# Patient Record
Sex: FEMALE | Race: WHITE
[De-identification: names, ages, dates, MRNs, and addresses within clinical notes are randomized per-mention and may not be internally consistent; named-entity substitution may affect disease eponyms.]

---

## 2019-07-16 NOTE — 2DMMODE
[place] [address]
Phone:  [phone] 2 D/M-MODE ECHOCARDIOGRAM     
_______________________________________________________________________________
 
Name:         JORDYN SARMIENTO                 Room:          [address]    ADM IN 
[facility]#:    [id number]     Account #:     [id number]  
Admission:    19    Attend Phys:   Dylan Patel
Discharge:                Date of Birth: [birth year]  
Date of Service: 19 1641  Report #:      [id number]
        [id number]
_______________________________________________________________________________
THIS REPORT FOR:  //name//                      
 
 
--------------- APPROVED REPORT --------------
 
 
Study performed:  2019 15:28:50
 
EXAM: Comprehensive 2D, Doppler, and color-flow 
Echocardiogram 
Patient Location: In-Patient   
Room #:  219     Status:  routine
 
        BSA:         1.82
HR: 61 bpm   BP:          106/80 mmHg 
Rhythm: Atrial Fibrillation     
 
Other Information 
Study Quality: Good
 
Indications
Atrial Fibrillation
 
2D Dimensions
IVSd:  9.29 (7-11mm) LVOT Diam:  20.18 (18-24mm) 
LVDd:  44.71 mm  
PWd:  10.68 (7-11mm) 
LVDs:  22.64 (25-40mm) 
Aortic Root:  33.32 mm 
 
Volumes
Left Atrial Volume (Systole) 
    LA ESV Index:  25.50 mL/m2
 
Aortic Valve
AoV Peak Thien.:  1.15 m/s 
AO Peak Gr.:  5.32 mmHg  LVOT Max P.89 mmHg
AO Mean Gr.:  2.40 mmHg  LVOT Mean P.24 mmHg
    LVOT Max V:  0.85 m/s
AO V2 VTI:  18.16 cm  LVOT Mean V:  0.50 m/s
TIFFANIE (VTI):  3.15 cm2  LVOT V1 VTI:  17.90 cm
 
Mitral Valve
    E/A Ratio:  1.70
    MV Decel. Time:  190.62 ms
MV E Max Thien.:  0.66 m/s 
 
 
[place] [address]
Phone:  [phone]                     2 D/M-MODE ECHOCARDIOGRAM     
_______________________________________________________________________________
 
Name:         JORDYN SARMIENTO                 Room:          [address] [place] 
..#:    [id number]     Account #:     [id number]  
Admission:    19    Attend Phys:   Dylan Patel
Discharge:                Date of Birth: [birth year]  
Date of Service: 19 1641  Report #:      [id number]
        [id number]
_______________________________________________________________________________
MV PHT:  55.28 ms  
MVA (PHT):  3.98 cm2  
 
TDI
E/Lateral E':  5.08 E/Medial E':  5.50
   Medial E' Thien.:  0.12 m/s
   Lateral E' Thien.:  0.13 m/s
 
Pulmonary Valve
PV Peak Thien.:  0.80 m/s PV Peak Gr.:  2.56 mmHg
 
Tricuspid Valve
    RAP Estimate:  5.00 mmHg
TR Peak Gr.:  26.65 mmHg RVSP:  31.00 mmHg
    PA Pressure:  31.00 mmHg
 
Left Ventricle
The left ventricle is normal size. There is normal LV segmental wall 
motion. There is normal left ventricular wall thickness. Left 
ventricular systolic function is normal. LVEF is 55-60%. This study 
is not technically sufficient to allow evaluation of the LV diastolic 
function due to atrial fibrillation.
 
Right Ventricle
The right ventricle is normal size. The right ventricular systolic 
function is normal.
 
Atria
The left atrium size is normal. The right atrium size is 
normal.
 
Aortic Valve
The aortic valve is normal in structure. No aortic regurgitation is 
present. There is no aortic valvular stenosis.
 
Mitral Valve
The mitral valve is normal in structure. Trace mitral regurgitation. 
No evidence of mitral valve stenosis.
 
Tricuspid Valve
The tricuspid valve is normal in structure. Mild tricuspid 
regurgitation. The RVSP is 30-35 mmHg.
 
Pulmonic Valve
The pulmonary valve is normal in structure. Trace pulmonic 
regurgitation.
 
 
[place] [address]
Phone:  [phone]                     2 D/M-MODE ECHOCARDIOGRAM     
_______________________________________________________________________________
 
Name:         JORDYN SARMIENTO                 Room:          [address] [place] 
[facility]#:    [id number]     Account #:     [id number]  
Admission:    19    Attend Phys:   Dylan Patel
Discharge:                Date of Birth: [birth year]  
Date of Service: 19 1641  Report #:      [id number]
        [id number]
_______________________________________________________________________________
 
Great Vessels
The aortic root is normal in size. IVC is normal in size and 
collapses >50% with inspiration.
 
Pericardium
There is no pericardial effusion.
 
<Conclusion>
The left ventricle is normal size.
There is normal left ventricular wall thickness.
Left ventricular systolic function is normal.
LVEF is 55-60%.
Trace mitral regurgitation.
Mild tricuspid regurgitation.
The RVSP is 30-35 mmHg.
 
 
 
 
 
 
 
 
 
 
 
 
 
 
 
 
 
 
 
 
 
 
 
 
 
 
 
 
  <ELECTRONICALLY SIGNED>
                                           By: Rick Bashir MD, FACC   
  19
D: 19   _____________________________________
T: 19   Rick Bashir MD, FACC     /INF

## 2019-07-16 NOTE — EKG
[place] [address]
Phone:  [phone]                     ELECTROCARDIOGRAM REPORT      
_______________________________________________________________________________
 
Name:       JORDYN SARMIENTO                  Room:           [address]    ADM IN  
..#:  [id number]      Account #:      [id number]  
Admission:  19     Attend Phys:    NANCY Stevens
Discharge:               Date of Birth:  [birth year]  
         Report #: [id number]
    [id number]
_______________________________________________________________________________
THIS REPORT FOR:  //name//                      
 
                         [facility] ED
                                       
Test Date:    2019               Test Time:    10:04:25
Pat Name:     JORDYN SARMIENTO              Department:   
Patient ID:   [id number]            Room:         [facility]
Gender:       F                        Technician:   [facility]
:          1955               Requested By: Sai Boles
Order Number: [id number] MD:   Rick Bashir
                                 Measurements
Intervals                              Axis          
Rate:         116                      P:            
NC:                                    QRS:          67
QRSD:         116                      T:            67
QT:           368                                    
QTc:          512                                    
                           Interpretive Statements
Atrial fibrillation with rapid ventricular response rate with aberrancy 
Baseline wander in lead(s) I,II,aVR
Compared to ECG 2013 11:53:37
Sinus rhythm no longer present
Ventricular premature complex(es) no longer present
Incomplete right bundle-branch block no longer present
 
Electronically Signed On 2019 16:25:02 CDT by Rick Bashir
[email or web address]
 
 
 
 
 
 
 
 
 
 
 
 
 
 
 
 
  <ELECTRONICALLY SIGNED>
                                           By: Rick Bashir MD, FACC   
  19     1625
D: 19 1004   _____________________________________
T: 19 1004   Rick Bashir MD, FACC     /EPI

## 2019-07-16 NOTE — NUR
PATIENT PRESENTED  PER CART WITH AA EPISODE OF A FIB WITH RVR.  PATIENT
IS AELRT AND ORIENTED X 4.  SHE DENIES CHEST PAIN AND SOA ON ARRIVAL.  BEDSIDE
ECHO PERFORMED.  PATIENT ADMITTED AND ORIENTED TO THE ROOM.  IV CARDIZEM IS
INFUSING AT 15 ML/HR.  PATIENT PLACED ON TELE MONITOR.  TELE SHOWS CONTINUED
AFIB.  WILL CONTINUE TO MONITOR VS AND ASSESSMENTS.

## 2019-07-17 NOTE — NUR
INITIAL ASSESSMENT:
Pt evaluated for d/c planning needs.  Reviewed chart and spoke with nurse and
spouse.  Pt is currently off unit for testing.  Pt lives in house with spouse
and was independent with ADL's prior to admission to the hospital.  Pt remains
active in the community and is still driving.  Pt and her  are retired.
Pt uses no DME and has not had home health.  Pt plans on returning home on d/c
from hospital.  Will remain available to assist as needed.

## 2019-07-17 NOTE — NUR
IV REMOVED, INFORMATION GIVEN TO PT ABOUT FOLLOW UP AND MEDICATION.
PRESCRIPTIONS GIVEN, PAPERWORK SIGNED. PT [facility] 1936 WITH .

## 2019-07-17 NOTE — NUR
PT SOMEWHAT PROGRESSING TOWARDS GOALS THIS SHIFT. COMPLETED STRESS TEST. TELE
REMAINS SR WITH MULTIPLE PAC'S AND PVC'S. MEDICATIONS CHANGED TO ORAL
AMIODARONE THIS SHIFT. NO OTHER CONCERNS AT THIS TIME. CLWR. WCTM.

## 2019-07-17 NOTE — CARDNUC
[place] [address]
Phone:  [phone]                     CARDIAC NUCLEAR IMAGING REPORT
_______________________________________________________________________________
 
Name:         JORDYN SARMIENTO                 Room:          [address] [place] 
[facility]#:    [id number]     Account #:     [id number]  
Admission:    07/16/19    Attend Phys:   Dylan Patel
Discharge:                Date of Birth: [birth year]  
Date of Service: 07/17/19 1625  Report #:      [id number]
        [id number] 
_______________________________________________________________________________
THIS REPORT FOR:  //name//                      
 
 
--------------- APPROVED REPORT --------------
 
 
Imaging Protocol: Rest Tc-99m/Stress Tc-99m 1 day
Study performed:  07/16/2019 14:46:00
 
Indication: Atrial Fibrillation
Patient Location: In-Patient
Room #: 219
Stress Tech: Alma Lewis
Stress Nurse: Polly Eastman RN
NM Tech:JOSHUA Pittman
 
Ht: 5 ft 10 in  Wt: 145 lbs  BSA:  1.82 m2
    BMI:  20.80
 
Medical History
Medical History: mitral valve prolapse
Medications: apixaban, amiodarone
Allergies: n
Cardiac Risk Factors: Age, FHX of CAD
Exercise History: Physically active
 
Resting Data
Rest SPECT myocardial perfusion imaging was performed in supine 
position 30 minutes following the intravenous injection of 11.3 mCi 
of Tc-99m Sestamibi.
Time of rest injection: 09:30     
The images were gated to evaluate regional wall motion and calculate 
left ventricular ejection fraction. 
Administration Route: IV
Administration Site: Right Arm
 
Pharmacologic Stress
Pharmacologic stress test was performed by injecting Regadenoson 0.4 
mg IV push over 10-15 seconds immediately followed by the intravenous 
injection of 34.6 mCi of Tc-99m Sestamibi.
Time of stress injection: 11:00     
Administration Route: IV
Administration Site: Right Arm
Heart Rate at time of stress injection: 93 bpm.
Gated Stress SPECT was performed 40 minutes after stress 
injection.
 
 
[place] [address]
Phone:  [phone]                     CARDIAC NUCLEAR IMAGING REPORT
_______________________________________________________________________________
 
Name:         JORDYN SARMIENTO                 Room:          [address] [place] 
..#:    [id number]     Account #:     [id number]  
Admission:    07/16/19    Attend Phys:   Dylan Patel
Discharge:                Date of Birth: [birth year]  
Date of Service: 07/17/19 1625  Report #:      [id number]
        [id number] 
_______________________________________________________________________________
The images were gated to evaluate regional wall motion and calculate 
left ventricular ejection fraction. 
Prone imaging was performed.
 
Stress Test Details
Stress Test:  Pharmacologic stress testing performed using 0.4 mg of 
regadenoson per 5 mL given IV over 10 seconds.      
  Reason for pharmacologic stress test: a fib.      
 
HR      Max Heart Rate (APMHR): 156 bpm  
Resting HR:            66 bpm   Target HR (85% APMHR): 132 bpm  
Max HR Achieved:  93 bpm        
% of APMHR:         59         
Recovery HR:            80 bpm        
 
BP           
Resting BP:  121/86 mmHg        
Max BP:       130/76 mmHg        
Recovery BP:       112/76 mmHg        
ECG           
Resting ECG:  Sinus Rhythm        
Stress ECG:     Sinus Rhythm        
ST Change: None          
Arrhythmia:    None         
Recovery ECG: Sinus Rhythm        
Recovery ST Change: None        
Recovery Arrhythmia: None        
 
Clinical
Reason for Termination: Completed protocol
Exercise duration: 0 min  sec
Exercise capacity: 1 METs
The patient tolerated Lexiscan infusion without significant 
symptoms.
 
Stress ECG Conclusion
The baseline 12-lead EKG shows sinus rhythm without significant ST or 
T wave abnormality. EKGs obtained during and post Lexiscan infusion 
show sinus rhythm with no significant ST or T wave changes when 
compared baseline. There were no stress-induced arrhythmias.
 
Study Quality
Study: Good
Artifact: No artifact 
 
Study Data
 
 
[place] [address]
Phone:  [phone]                     CARDIAC NUCLEAR IMAGING REPORT
_______________________________________________________________________________
 
Name:         JORDYN SARMIENTO                 Room:          [address] [place] 
[facility]#:    [id number]     Account #:     [id number]  
Admission:    07/16/19    Attend Phys:   Dylan Patel
Discharge:                Date of Birth: [birth year]  
Date of Service: 07/17/19 1625  Report #:      [id number]
        [id number] 
_______________________________________________________________________________
At rest, the left ventricular ejection fraction was 59%..   
Post stress, the left ventricular ejection was T6%..   
TID = 0.92.       
 
Perfusion
Normal left ventricular perfusion.
 
Wall Motion
Normal left ventricular wall motion.
 
Nuclear Conclusion
ECG Findings: negative for ischemia 
Clinical Findings: negative for ischemia 
Nuclear Findings: negative for ischemia 
Exercise Capacity: not assessed
Left Ventricular Function: normal 
Risk Study: low
Myocardial perfusion images show no defect to suggest infarct or 
ischemia. Left ventricular systolic function appears normal on gated 
studies. This is a low risk study. 
 
<Conclusion>
The baseline 12-lead EKG shows sinus rhythm without significant ST or 
T wave abnormality. EKGs obtained during and post Lexiscan infusion 
show sinus rhythm with no significant ST or T wave changes when 
compared baseline. There were no stress-induced arrhythmias.
 
 
 
 
 
 
 
 
 
 
 
 
 
 
 
 
 
 
  <ELECTRONICALLY SIGNED>
                                           By: Rick Bashir MD, FACC   
  07/17/19     1625
D: 07/17/19 1625   _____________________________________
T: 07/17/19 1625   Rick Bashir MD, FACC     /INF

## 2019-07-18 NOTE — CON
[address]  [address]                    CONSULTATION                  
_______________________________________________________________________________
 
Name:       JORDYN SARMIENTO                  Room:           [address] [place] [facility]#:  [id number]      Account #:      [id number]  
Admission:  07/16/19     Attend Phys:    NANCY Stevens
Discharge:  07/17/19     Date of Birth:  [birth year]  
         Report #: [id number]
                                                                     [id number]  
_______________________________________________________________________________
THIS REPORT FOR:  //name//                      
 
CC: Jenny Patel
 
INDICATION:  Atrial fibrillation with rapid ventricular response rate.
 
HISTORY OF PRESENT ILLNESS:  The patient is very pleasant 63-year-old white
female who was admitted to the Emergency Room with atrial fibrillation with
rapid ventricular response rate.  She has been having some low blood pressures
at home with some occasional orthostatic symptoms.  She denies chest pain.  She
is without other cardiac complaint at this time.  She is not having any
shortness of breath.  She does have chronic allergy symptoms.
 
PAST SURGICAL HISTORY:
1.  Bladder suspension.
2.  Tubal ligation.
3.  Sinus surgery.
4.  Right knee surgery.
 
PAST MEDICAL HISTORY:
1.  Mitral valve prolapse diagnosed 40 years ago.
2.  Paroxysmal atrial fibrillation diagnosed today.
 
FAMILY HISTORY:  The patient's brother had some strokes.
 
SOCIAL HISTORY:  The patient is .  She is retired.  She drinks alcohol
occasionally.  She does not smoke.
 
ALLERGIES:  ASPIRIN, ERYTHROMYCIN, AND ASTELIN.
 
HOME MEDICATIONS:  Albuterol 2 puffs every 6 hours p.r.n., Xanax 0.5 mg at
bedtime p.r.n., Zyrtec 10 mg daily, Flonase 1 spray each nostril daily, Breo
Ellipta 1 puff daily as needed, acidophilus 4 tablets 3 times daily, Singulair
10 mg nightly, multivitamin 1 tablet daily.
 
REVIEW OF SYSTEMS:  A 14-point review of systems is positive for asthma,
palpitations, seasonal allergies, medical allergies, ASPIRIN ALLERGY, triad
asthma, depression and anxiety.
 
PHYSICAL EXAMINATION:
VITAL SIGNS:  Blood pressure is 106/80, pulse is in the one-teens and irregular.
GENERAL:  This is a pleasant lady in no distress.  Mood and affect appropriate.
HEENT:  Extraocular muscles intact.  Mucous membranes moist.
NECK:  Shows no jugular venous distension.  There are no carotid bruits.
 
 
 
[place] [address]                    CONSULTATION                  
_______________________________________________________________________________
 
Name:       JORDYN SARMIENTO                  Room:           [address] 
[facility]#:  [id number]      Account #:      [id number]  
Admission:  07/16/19     Attend Phys:    NANCY Stevens
Discharge:  07/17/19     Date of Birth:  [birth year]  
         Report #: [id number]
                                                                     [id number]  
_______________________________________________________________________________
CHEST:  Reveals clear lung fields without wheezes or rales.
CARDIOVASCULAR:  Reveals an irregularly irregular rhythm that is tachycardic
without obvious gallop or murmur.
ABDOMEN:  Reveals normal bowel sounds.  The abdomen is soft and nontender.
EXTREMITIES:  Shows no edema.  Peripheral pulses palpable.
SKIN:  Warm and dry.
 
DIAGNOSTIC DATA:  A 12-lead EKG shows AFib with rapid ventricular response rate
and bundle branch block intermittently.
 
LABORATORY DATA:  Reviewed.  Electrolytes within normal limits.  BUN 21,
creatinine 1.2, serum glucose 99.  LFTs within normal limits.  Troponin less
than 0.06 on 2 separate occasions.  NT-proBNP 900.  Coags within normal limits. 
White blood cell count 4.4, hemoglobin 15.4, and platelet count 165,000.
 
IMPRESSION AND RECOMMENDATIONS:  New onset atrial fibrillation with rapid
ventricular response.  Continue diltiazem drip.  We will re-bolus for better
rate control.  We will obtain echocardiogram and stress test to further evaluate
cardiac structure and function.  We will check thyroid function studies. 
Further cardiac treatment or evaluation pending the results of those studies.
 
 
 
 
 
 
 
 
 
 
 
 
 
 
 
 
 
 
 
 
 
 
 
 
<ELECTRONICALLY SIGNED>
                                        By:  Rick Bashir MD, FACC   
07/18/19     1702
D: 07/16/19 1450_______________________________________
T: 07/16/19 2345Micsammi Bashir MD, FACC      /nt

## 2019-09-09 ENCOUNTER — HOSPITAL ENCOUNTER (OUTPATIENT)
Dept: HOSPITAL 96 - M.RAD | Age: 64
End: 2019-09-09
Attending: INTERNAL MEDICINE
Payer: MEDICARE

## 2019-09-09 DIAGNOSIS — Z12.31: Primary | ICD-10-CM
